# Patient Record
Sex: FEMALE
[De-identification: names, ages, dates, MRNs, and addresses within clinical notes are randomized per-mention and may not be internally consistent; named-entity substitution may affect disease eponyms.]

---
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
## 2022-02-09 ENCOUNTER — EMERGENCY (EMERGENCY)
Facility: HOSPITAL | Age: 22
LOS: 1 days | Discharge: HOME | End: 2022-02-09
Attending: EMERGENCY MEDICINE
Payer: COMMERCIAL

## 2022-02-09 VITALS
SYSTOLIC BLOOD PRESSURE: 111 MMHG | HEART RATE: 79 BPM | TEMPERATURE: 98 F | WEIGHT: 160.06 LBS | HEIGHT: 62 IN | RESPIRATION RATE: 20 BRPM | DIASTOLIC BLOOD PRESSURE: 76 MMHG | OXYGEN SATURATION: 97 %

## 2022-02-09 LAB — HCG UR QL: NEGATIVE — SIGNIFICANT CHANGE UP

## 2022-02-09 PROCEDURE — 73501 X-RAY EXAM HIP UNI 1 VIEW: CPT

## 2022-02-09 PROCEDURE — 99284 EMERGENCY DEPT VISIT MOD MDM: CPT

## 2022-02-09 PROCEDURE — 99283 EMERGENCY DEPT VISIT LOW MDM: CPT | Mod: 25

## 2022-02-09 PROCEDURE — 73501 X-RAY EXAM HIP UNI 1 VIEW: CPT | Mod: 26,RT

## 2022-02-09 PROCEDURE — 81025 URINE PREGNANCY TEST: CPT

## 2022-02-09 RX ORDER — IBUPROFEN 200 MG
600 TABLET ORAL ONCE
Refills: 0 | Status: COMPLETED | OUTPATIENT
Start: 2022-02-09 | End: 2022-02-09

## 2022-02-09 RX ORDER — IBUPROFEN 200 MG
1 TABLET ORAL
Qty: 15 | Refills: 0
Start: 2022-02-09 | End: 2022-02-13

## 2022-02-09 RX ADMIN — Medication 600 MILLIGRAM(S): at 08:35

## 2022-02-09 RX ADMIN — Medication 600 MILLIGRAM(S): at 08:26

## 2022-02-09 NOTE — ED PROVIDER NOTE - OBJECTIVE STATEMENT
pt with complaint of pain to right hip for close to one year   has increased exercising with weights recently with a  and now the pain is worse  has been stretching prior to exercise  no discrete injury   takes Tylenol occasionally with little relief  no numbness/weakness bowel/bladder issues  able to walk but it is painful  she sits for long periods at work and feels that sitting for too long also makes pain worse  has not seen a doctor for this yet bc she thought is would just go away.  but for last week it has gotten worse so came to the ED

## 2022-02-09 NOTE — ED PROVIDER NOTE - PATIENT PORTAL LINK FT
You can access the FollowMyHealth Patient Portal offered by [facility] by registering at the following website: [email or web address]. By joining [facility] FollowMyHealth portal, you will also be able to view your health information using other applications (apps) compatible with our system.

## 2022-02-09 NOTE — ED ADULT NURSE NOTE - CAS EDN DISCHARGE ASSESSMENT
Patient is stable  and will continue present plan of care and reassess at next routine visit  All questions about this problem from patient were answered today  Alert and oriented to person, place and time

## 2022-02-09 NOTE — ED PROVIDER NOTE - NSFOLLOWUPINSTRUCTIONS_ED_ALL_ED_FT
Hip Sprain    WHAT YOU NEED TO KNOW:    A hip sprain happens when a ligament in your hip is stretched or torn. Ligaments (tough tissue that connects bones) surround your hip and hold it in place.    Normal Hip Joint         DISCHARGE INSTRUCTIONS:    Call your doctor if:   •You cannot stand on the leg on your injured side.      •You have new or increased stiffness or trouble moving your injured hip.      •You have new or increased numbness in the leg on your injured side.      •You have increased swelling and pain in your hip.      •The leg on your injured side looks bluish or pale.      •You have questions or concerns about your condition or care.      Medicines:   •NSAIDs, such as ibuprofen, help decrease swelling, pain, and fever. This medicine is available with or without a doctor's order. NSAIDs can cause stomach bleeding or kidney problems in certain people. If you take blood thinner medicine, always ask if NSAIDs are safe for you. Always read the medicine label and follow directions. Do not give these medicines to children under 6 months of age without direction from your child's healthcare provider.      •Take your medicine as directed. Contact your healthcare provider if you think your medicine is not helping or if you have side effects. Tell him or her if you are allergic to any medicine. Keep a list of the medicines, vitamins, and herbs you take. Include the amounts, and when and why you take them. Bring the list or the pill bottles to follow-up visits. Carry your medicine list with you in case of an emergency.      How to care for your hip sprain:   •Rest your hip for 2 to 3 days after your injury. This will help decrease the risk of more damage to your hip.      •Apply ice on your hip to decrease swelling and pain. Use an ice pack, or put crushed ice in a plastic bag. Cover the bag with a towel before you place it on your hip. Apply ice for 15 to 20 minutes every hour or as directed. You may need to apply ice to your hip at least 4 to 8 times each day for the first 48 hours.      How to exercise your hip: help decrease stiffness. Your healthcare provider may want you to start hip exercises after you rest your hip for about 3 days. He or she may also have you start physical therapy. A physical therapist teaches you light exercises to help decrease pain and swelling and improve hip movement. When you are able to move your hip without pain, you will be taught exercises to improve your strength. If you have a severe sprain, you may need to wait 1 to 3 weeks before you exercise your hip.    How to prevent another injury: Ask your healthcare provider when you can return to your normal activities. The following can help decrease your risk for sprains:  •Do not exercise when you feel pain or you are tired.      •Eat healthy foods. This can help keep your muscles strong. Ask about the best meal plan for you.      •Exercise daily. Make sure you warm up and stretch before you exercise.      •Wear equipment to protect yourself when you play sports.      •Wear shoes that fit well to decrease your risk for falls.      •Stop exercising and playing sports if your symptoms from a past injury return.      Follow up with your doctor as directed: Write down your questions so you remember to ask them during your visits.

## 2022-02-09 NOTE — ED PROVIDER NOTE - CLINICAL SUMMARY MEDICAL DECISION MAKING FREE TEXT BOX
pt with acute on chronic hip pain made worse by lifting weights at the gym  will get xray give nsaids and refer to sports medicine

## 2022-02-09 NOTE — ED ADULT NURSE NOTE - NSIMPLEMENTINTERV_GEN_ALL_ED
Implemented All Fall Risk Interventions:  [place] to call system. Call bell, personal items and telephone within reach. Instruct patient to call for assistance. Room bathroom lighting operational. Non-slip footwear when patient is off stretcher. Physically safe environment: no spills, clutter or unnecessary equipment. Stretcher in lowest position, wheels locked, appropriate side rails in place. Provide visual cue, wrist band, yellow gown, etc. Monitor gait and stability. Monitor for mental status changes and reorient to person, place, and time. Review medications for side effects contributing to fall risk. Reinforce activity limits and safety measures with patient and family.

## 2022-02-23 PROBLEM — Z78.9 OTHER SPECIFIED HEALTH STATUS: Chronic | Status: ACTIVE | Noted: 2022-02-09

## 2022-02-28 PROBLEM — Z00.00 ENCOUNTER FOR PREVENTIVE HEALTH EXAMINATION: Status: ACTIVE | Noted: 2022-02-28

## 2022-03-14 ENCOUNTER — APPOINTMENT (OUTPATIENT)
Dept: ORTHOPEDIC SURGERY | Facility: CLINIC | Age: 22
End: 2022-03-14
Payer: COMMERCIAL

## 2022-03-16 ENCOUNTER — NON-APPOINTMENT (OUTPATIENT)
Age: 22
End: 2022-03-16

## 2022-03-17 ENCOUNTER — APPOINTMENT (OUTPATIENT)
Dept: ORTHOPEDIC SURGERY | Facility: CLINIC | Age: 22
End: 2022-03-17
Payer: COMMERCIAL

## 2022-03-17 VITALS — WEIGHT: 155 LBS | BODY MASS INDEX: 28.52 KG/M2 | RESPIRATION RATE: 16 BRPM | HEIGHT: 62 IN

## 2022-03-17 DIAGNOSIS — Z78.9 OTHER SPECIFIED HEALTH STATUS: ICD-10-CM

## 2022-03-17 DIAGNOSIS — M25.851 OTHER SPECIFIED JOINT DISORDERS, RIGHT HIP: ICD-10-CM

## 2022-03-17 DIAGNOSIS — M25.551 PAIN IN RIGHT HIP: ICD-10-CM

## 2022-03-17 PROCEDURE — 99203 OFFICE O/P NEW LOW 30 MIN: CPT

## 2022-03-17 PROCEDURE — 73502 X-RAY EXAM HIP UNI 2-3 VIEWS: CPT | Mod: RT
